# Patient Record
Sex: MALE | Race: WHITE
[De-identification: names, ages, dates, MRNs, and addresses within clinical notes are randomized per-mention and may not be internally consistent; named-entity substitution may affect disease eponyms.]

---

## 2020-09-23 ENCOUNTER — HOSPITAL ENCOUNTER (EMERGENCY)
Dept: HOSPITAL 97 - ER | Age: 80
Discharge: HOME | End: 2020-09-23
Payer: COMMERCIAL

## 2020-09-23 VITALS — DIASTOLIC BLOOD PRESSURE: 94 MMHG | SYSTOLIC BLOOD PRESSURE: 164 MMHG | OXYGEN SATURATION: 97 %

## 2020-09-23 VITALS — TEMPERATURE: 97 F

## 2020-09-23 DIAGNOSIS — I10: ICD-10-CM

## 2020-09-23 DIAGNOSIS — I11.9: Primary | ICD-10-CM

## 2020-09-23 DIAGNOSIS — H92.02: ICD-10-CM

## 2020-09-23 LAB
ALBUMIN SERPL BCP-MCNC: 3.7 G/DL (ref 3.4–5)
ALP SERPL-CCNC: 69 U/L (ref 45–117)
ALT SERPL W P-5'-P-CCNC: 19 U/L (ref 12–78)
AST SERPL W P-5'-P-CCNC: 12 U/L (ref 15–37)
BUN BLD-MCNC: 20 MG/DL (ref 7–18)
GLUCOSE SERPLBLD-MCNC: 100 MG/DL (ref 74–106)
HCT VFR BLD CALC: 41.6 % (ref 39.6–49)
INR BLD: 0.93
LYMPHOCYTES # SPEC AUTO: 1.6 K/UL (ref 0.7–4.9)
MAGNESIUM SERPL-MCNC: 2.3 MG/DL (ref 1.8–2.4)
NT-PROBNP SERPL-MCNC: 100 PG/ML (ref ?–450)
PMV BLD: 8.4 FL (ref 7.6–11.3)
POTASSIUM SERPL-SCNC: 3.9 MMOL/L (ref 3.5–5.1)
RBC # BLD: 5.01 M/UL (ref 4.33–5.43)
TROPONIN (EMERG DEPT USE ONLY): < 0.02 NG/ML (ref 0–0.04)

## 2020-09-23 PROCEDURE — 83880 ASSAY OF NATRIURETIC PEPTIDE: CPT

## 2020-09-23 PROCEDURE — 99285 EMERGENCY DEPT VISIT HI MDM: CPT

## 2020-09-23 PROCEDURE — 85025 COMPLETE CBC W/AUTO DIFF WBC: CPT

## 2020-09-23 PROCEDURE — 96365 THER/PROPH/DIAG IV INF INIT: CPT

## 2020-09-23 PROCEDURE — 71045 X-RAY EXAM CHEST 1 VIEW: CPT

## 2020-09-23 PROCEDURE — 93005 ELECTROCARDIOGRAM TRACING: CPT

## 2020-09-23 PROCEDURE — 83735 ASSAY OF MAGNESIUM: CPT

## 2020-09-23 PROCEDURE — 85610 PROTHROMBIN TIME: CPT

## 2020-09-23 PROCEDURE — 80076 HEPATIC FUNCTION PANEL: CPT

## 2020-09-23 PROCEDURE — 36415 COLL VENOUS BLD VENIPUNCTURE: CPT

## 2020-09-23 PROCEDURE — 84484 ASSAY OF TROPONIN QUANT: CPT

## 2020-09-23 PROCEDURE — 80048 BASIC METABOLIC PNL TOTAL CA: CPT

## 2020-09-23 NOTE — EDPHYS
Physician Documentation                                                                           

 [facility]                                                                 

Name: Jesus Alberto Escalante                                                                                  

Age: 80 yrs                                                                                       

Sex: Male                                                                                         

: 1940                                                                                   

MRN: [id number]                                                                                   

Arrival Date: 2020                                                                          

Time: 15:23                                                                                       

Account#: [id number]                                                                            

Bed 4                                                                                             

Private MD:                                                                                       

ED Physician Austin Salas                                                                       

HPI:                                                                                              

                                                                                             

20:19 This 80 yrs old  Male presents to ER via Ambulatory with complaints of High    kdr 

      Blood Pressure.                                                                             

20:19 The patient has elevated blood pressure and discovered this at a physician's office,    [facility] 

      and sent to the emergency department for evaluation. Onset: The symptoms/episode            

      began/occurred at an unknown time. Modifying factors: The symptoms are aggravated by        

      Nothing, The symptoms are alleviated by Nothing. Associated signs and symptoms: The         

      patient has no apparent associated signs or symptoms. Severity of symptoms: At its          

      worst the blood pressure was severe, just prior to arrival, 217 mm Hg, in the emergency     

      department the blood pressure is improved, mildly, 200 mm Hg, Initially. The patient        

      has not experienced similar symptoms in the past. The patient has not recently seen a       

      physician. The patient was unaware that his BP was elevated.                                

                                                                                                  

Historical:                                                                                       

- Allergies:                                                                                      

15:45 No Known Allergies;                                                                     ss  

- Home Meds:                                                                                      

15:45 candesartan oral 20 mg oral 1 tab once daily [Active];                                  ss  

- PMHx:                                                                                           

15:45 Hypertension;                                                                           ss  

                                                                                                  

- Immunization history:: Adult Immunizations up to date.                                          

- Social history:: Smoking status: Patient denies any tobacco usage or history of.                

                                                                                                  

                                                                                                  

ROS:                                                                                              

20:19 Constitutional: Negative for fever, chills, and weight loss, Eyes: Negative for injury, kdr 

      pain, redness, and discharge, ENT: Negative for injury, pain, and discharge, Neck:          

      Negative for injury, pain, and swelling, Cardiovascular: Negative for chest pain,           

      palpitations, and edema, Respiratory: Negative for shortness of breath, cough,              

      wheezing, and pleuritic chest pain, Abdomen/GI: Negative for abdominal pain, nausea,        

      vomiting, diarrhea, and constipation, Back: Negative for injury and pain, : Negative      

      for injury, bleeding, discharge, and swelling, MS/Extremity: Negative for injury and        

      deformity, Skin: Negative for injury, rash, and discoloration, Neuro: Negative for          

      headache, weakness, numbness, tingling, and seizure activity. Psych: Negative for           

      depression, anxiety, suicide ideation, homicidal ideation, and hallucinations,              

      Allergy/Immunology: Negative for hives, rash, and allergies, Endocrine: Negative for        

      neck swelling, polydipsia, polyuria, polyphagia, and marked weight changes,                 

      Hematologic/Lymphatic: Negative for swollen nodes, abnormal bleeding, and unusual           

      bruising.                                                                                   

                                                                                                  

Exam:                                                                                             

20:19 Constitutional:  This is a well developed, well nourished patient who is awake, alert,  kdr 

      and in no acute distress. Head/Face:  Normocephalic, atraumatic. Eyes:  Pupils equal        

      round and reactive to light, extra-ocular motions intact.  Lids and lashes normal.          

      Conjunctiva and sclera are non-icteric and not injected.  Cornea within normal limits.      

      Periorbital areas with no swelling, redness, or edema. Neck:  Trachea midline, no           

      thyromegaly or masses palpated, and no cervical lymphadenopathy.  Supple, full range of     

      motion without nuchal rigidity, or vertebral point tenderness.  No Meningismus.             

      Chest/axilla:  Normal chest wall appearance and motion.  Nontender with no deformity.       

      No lesions are appreciated. Cardiovascular:  Regular rate and rhythm with a normal S1       

      and S2.  No gallops, murmurs, or rubs.  Normal PMI, no JVD.  No pulse deficits.             

      Respiratory:  Lungs have equal breath sounds bilaterally, clear to auscultation and         

      percussion.  No rales, rhonchi or wheezes noted.  No increased work of breathing, no        

      retractions or nasal flaring. Abdomen/GI:  Soft, non-tender, with normal bowel sounds.      

      No distension or tympany.  No guarding or rebound.  No evidence of tenderness               

      throughout. Back:  No spinal tenderness.  No costovertebral tenderness.  Full range of      

      motion. Skin:  Warm, dry with normal turgor.  Normal color with no rashes, no lesions,      

      and no evidence of cellulitis. MS/ Extremity:  Pulses equal, no cyanosis.                   

      Neurovascular intact.  Full, normal range of motion. Neuro:  Awake and alert, GCS 15,       

      oriented to person, place, time, and situation.  Cranial nerves II-XII grossly intact.      

      Motor strength 5/5 in all extremities.  Sensory grossly intact.  Cerebellar exam            

      normal.  Normal gait. Psych:  Awake, alert, with orientation to person, place and time.     

       Behavior, mood, and affect are within normal limits.                                       

20:36 ECG was reviewed by the Attending Physician.                                            kdr 

                                                                                                  

Vital Signs:                                                                                      

15:40  / 117; Pulse 60; Resp 16; Temp 97.0(TE); Pulse Ox 100% on R/A; Weight 74.39 kg;  ss  

      Height 5 ft. 9 in. (175.26 cm); Pain 0/10;                                                  

16:00  / 107; Pulse 62; Resp 17; Pulse Ox 99% ;                                         jl7 

16:30  / 94; Pulse 66; Resp 15; Pulse Ox 98% ;                                          jl7 

17:16  / 96; Pulse 75; Resp 17; Pulse Ox 100% ;                                         jl7 

17:56  / 101; Pulse 72;                                                                 ss  

18:45  / 92;                                                                            ss  

18:45 Pulse 58;                                                                               ss  

18:48  / 94; Pulse 64; Pulse Ox 97% on R/A; Pain 0/10;                                  ss  

15:40 Body Mass Index 24.22 (74.39 kg, 175.26 cm)                                             ss  

17:56 Dr. Salas notified. Hydralazine 25mg PO ordered                                       ss  

                                                                                                  

MDM:                                                                                              

17:34 Patient medically screened.                                                             kdr 

20:19 Data reviewed: vital signs, nurses notes, lab test result(s). Counseling: I had a       kdr 

      detailed discussion with the patient and/or guardian regarding: the historical points,      

      exam findings, and any diagnostic results supporting the discharge/admit diagnosis, lab     

      results, the need for outpatient follow up. ED course: The patient BP responded well to     

      Hydralazine. Consulted DR. Martell who saw the patient in the ED and advised on discharge      

      and follow-up.                                                                              

                                                                                                  

                                                                                             

15:45 Order name: Basic Metabolic Panel; Complete Time: 17:                                 [facility] 

                                                                                             

15:45 Order name: CBC with Diff; Complete Time: 17:                                         [facility] 

                                                                                             

15:45 Order name: LFT's; Complete Time: 17:                                                 [facility] 

                                                                                             

15:45 Order name: Magnesium; Complete Time: 17:                                             [facility] 

                                                                                             

15:45 Order name: NT PRO-BNP; Complete Time: 17:                                            [facility] 

                                                                                             

15:45 Order name: PT-INR; Complete Time: 17:                                                [facility] 

                                                                                             

15:45 Order name: Troponin (emerg Dept Use Only); Complete Time: 17:                        [facility] 

                                                                                             

15:45 Order name: XRAY Chest (1 view); Complete Time: 17:                                   [facility] 

                                                                                             

15:45 Order name: EKG; Complete Time: 15:46                                                   [facility] 

                                                                                             

15:45 Order name: Cardiac monitoring; Complete Time: 16:13                                    [facility] 

                                                                                             

15:45 Order name: EKG - Nurse/Tech; Complete Time: 16:39                                      [facility] 

                                                                                             

15:45 Order name: IV Saline Lock; Complete Time: 16:13                                        [facility] 

                                                                                             

15:45 Order name: Labs collected and sent; Complete Time: 16:13                               [facility] 

                                                                                             

15:45 Order name: O2 Per Protocol; Complete Time: 16:13                                       [facility] 

                                                                                             

15:45 Order name: O2 Sat Monitoring; Complete Time: 16:13                                     [facility] 

                                                                                                  

EC:36 Rate is 62 beats/min. Rhythm is regular, Normal Sinus Rhythm with No ectopy. QRS Axis   kdr 

      is Normal. HI interval is normal. QRS interval is normal. QT interval is normal. No Q       

      waves. Clinical impression: Normal ECG.                                                     

                                                                                                  

Administered Medications:                                                                         

16:00 Drug: hydrALAZINE 10 mg Route: IV; Rate: calculated rate; Site: left forearm;           [facility] 

16:30 Follow up: Response: Blood pressure is lowered; IV Status: Completed infusion           jl7 

18:01 Not Given (10 mg tabs available only. GIve 20 mg now per Dr. Salas): HydrALAZINE 25   ss  

      mg PO once                                                                                  

18:07 Drug: hydrALAZINE 20 mg Route: PO;                                                      ss  

18:50 Follow up: Response: No adverse reaction; Blood pressure is lowered                     jl7 

                                                                                                  

                                                                                                  

Disposition:                                                                                      

20 17:34 Discharged to Home. Impression: Hypertensive heart disease - Poorly controlled.    

- Condition is Stable.                                                                            

- Discharge Instructions: Hypertension, Easy-to-Read.                                             

- Prescriptions for Hydralazine 25 mg Oral Tablet - take 1 tablet by ORAL route 2 times           

  per day with food; 20 tablet.                                                                   

- Medication Reconciliation Form, Thank You Letter form.                                          

- Follow up: Private Physician; When: 2 - 3 days; Reason: If symptoms return, Further             

  diagnostic work-up, Recheck today's complaints, Continuance of care, Re-evaluation by           

  your physician.                                                                                 

- Problem is an acute exacerbation.                                                               

- Symptoms have improved.                                                                         

- Notes: Please call Dr. Martell if [phone])you are unable to contact Dr. Jiménez for              

  follow-up                                                                                       

                                                                                                  

                                                                                                  

Signatures:                                                                                       

Dispatcher [employer]                           EDMS                                                 

Austin Salas MD MD   kdr                                                  

Neva Son RN                      RN   ss                                                   

Bismark Jules RN                        RN   jl7                                                  

                                                                                                  

Corrections: (The following items were deleted from the chart)                                    

19:01 17:34 2020 17:34 Discharged to Home. Impression: Hypertensive heart disease -     jl7 

      Poorly controlled. Condition is Stable. Forms are Medication Reconciliation Form, Thank     

      You Letter, Antibiotic Education, Prescription Opioid Use. Follow up: Private               

      Physician; When: 2 - 3 days; Reason: If symptoms return, Further diagnostic work-up,        

      Recheck today's complaints, Continuance of care, Re-evaluation by your physician.           

      Problem is an acute exacerbation. Symptoms have improved. kdr                               

                                                                                                  

**************************************************************************************************

## 2020-09-23 NOTE — RAD REPORT
EXAM DESCRIPTION:  RAD - Chest Single View - 9/23/2020 4:42 pm

 

CLINICAL HISTORY:  HTN

Chest pain.

 

COMPARISON:  CHEST PA AND LAT 2 VIEW dated 7/11/2013

 

FINDINGS:  Portable technique limits examination quality.

 

The lungs are grossly clear. The heart is upper limit of normal in size. No displaced fractures.

 

IMPRESSION:  No acute intrathoracic process suspected.

## 2020-09-23 NOTE — ER
Nurse's Notes                                                                                     

 [facility]                                                                 

Name: Jesus Alberto Escalante                                                                                  

Age: 80 yrs                                                                                       

Sex: Male                                                                                         

: 1940                                                                                   

MRN: [id number]                                                                                   

Arrival Date: 2020                                                                          

Time: 15:23                                                                                       

Account#: [id number]                                                                            

Bed 4                                                                                             

Private MD:                                                                                       

Diagnosis: Hypertensive heart disease-Poorly controlled                                           

                                                                                                  

Presentation:                                                                                     

                                                                                             

15:40 Chief complaint: Patient states: "I had an appointment with Dr. Zhao, but my blood   ss  

      pressure was 200/?? and she advised me to come here." Pt denies SOB, CP, reports he may     

      have a slight headache. Coronavirus screen: Client denies travel out of the [place] in the     

      last 14 days. Ebola Screen: Patient denies exposure to infectious person. Patient           

      denies travel to an Ebola-affected area in the 21 days before illness onset. Initial        

      Sepsis Screen: Does the patient meet any 2 criteria? No. Patient's initial sepsis           

      screen is negative. Does the patient have a suspected source of infection? No.              

      Patient's initial sepsis screen is negative. Risk Assessment: Do you want to hurt           

      yourself or someone else? Patient reports no desire to harm self or others.                 

15:40 Method Of Arrival: Ambulatory                                                           ss  

15:40 Acuity: DARÍO 2                                                                           ss  

16:54 Onset of symptoms is unknown. Care prior to arrival: None. Transition of care: patient  jose 

      was not received from another setting of care.                                              

                                                                                                  

Historical:                                                                                       

- Allergies:                                                                                      

15:45 No Known Allergies;                                                                     ss  

- Home Meds:                                                                                      

15:45 candesartan oral 20 mg oral 1 tab once daily [Active];                                  ss  

- PMHx:                                                                                           

15:45 Hypertension;                                                                           ss  

                                                                                                  

- Immunization history:: Adult Immunizations up to date.                                          

- Social history:: Smoking status: Patient denies any tobacco usage or history of.                

                                                                                                  

                                                                                                  

Screenin:00 Abuse screen: Denies threats or abuse. Denies injuries from another. Nutritional        jl7 

      screening: No deficits noted. Tuberculosis screening: No symptoms or risk factors           

      identified. Fall Risk IV access (20 points). Total Veras Fall Scale indicates No Risk       

      (0-24 pts).                                                                                 

                                                                                                  

Assessment:                                                                                       

16:00 General: Appears in no apparent distress. uncomfortable, Behavior is calm, cooperative, jl7 

      appropriate for age. Pain: Denies pain. Neuro: Level of Consciousness is awake, alert,      

      obeys commands, Oriented to person, place, time, situation. Cardiovascular: Denies          

      chest pain, Patient's skin is warm and dry. Respiratory: Airway is patent Respiratory       

      effort is even, unlabored, Respiratory pattern is regular, symmetrical, Denies              

      shortness of breath. GI: No signs and/or symptoms were reported involving the               

      gastrointestinal system. : No signs and/or symptoms were reported regarding the           

      genitourinary system. Derm: Skin is pink, warm \T\ dry.                                     

17:00 Reassessment: Patient appears in no apparent distress at this time. No changes from     jl7 

      previously documented assessment. Patient and/or family updated on plan of care and         

      expected duration. Pain level reassessed. Patient is alert, oriented x 3, equal             

      unlabored respirations, skin warm/dry/pink.                                                 

18:07 Reassessment: Discharge on hold. Observing patient and monitoring VS after hydralazine  ss  

      administration.                                                                             

18:59 Reassessment: Patient appears in no apparent distress at this time. No changes from     jl7 

      previously documented assessment. Patient and/or family updated on plan of care and         

      expected duration. Pain level reassessed. Patient is alert, oriented x 3, equal             

      unlabored respirations, skin warm/dry/pink.                                                 

                                                                                                  

Vital Signs:                                                                                      

15:40  / 117; Pulse 60; Resp 16; Temp 97.0(TE); Pulse Ox 100% on R/A; Weight 74.39 kg;  ss  

      Height 5 ft. 9 in. (175.26 cm); Pain 0/10;                                                  

16:00  / 107; Pulse 62; Resp 17; Pulse Ox 99% ;                                         jl7 

16:30  / 94; Pulse 66; Resp 15; Pulse Ox 98% ;                                          jl7 

17:16  / 96; Pulse 75; Resp 17; Pulse Ox 100% ;                                         jl7 

17:56  / 101; Pulse 72;                                                                 ss  

18:45  / 92;                                                                            ss  

18:45 Pulse 58;                                                                               ss  

18:48  / 94; Pulse 64; Pulse Ox 97% on R/A; Pain 0/10;                                  ss  

15:40 Body Mass Index 24.22 (74.39 kg, 175.26 cm)                                             ss  

17:56 Dr. Salas notified. Hydralazine 25mg PO ordered                                       ss  

                                                                                                  

ED Course:                                                                                        

15:23 Patient arrived in ED.                                                                  ag5 

15:35 Bismark Jules, SILVANO is Primary Nurse.                                                      jl7 

15:43 Triage completed.                                                                       ss  

15:44 Austin Salas MD is Attending Physician.                                              kdr 

15:45 Arm band placed on right wrist.                                                         ss  

16:00 Patient has correct armband on for positive identification. Placed in gown. Bed in low  jl7 

      position. Call light in reach. Side rails up X 1. Cardiac monitor on. Pulse ox on. NIBP     

      on.                                                                                         

16:00 Initial lab(s) drawn, by me, sent to lab. Inserted saline lock: 20 gauge in left        jl7 

      forearm, using aseptic technique. Blood collected.                                          

16:36 EKG done, by ED staff, reviewed by Austin Salas MD.                                    [facility] 

16:42 XRAY Chest (1 view) In Process Unspecified.                                             EDMS

18:59 No provider procedures requiring assistance completed. IV discontinued, intact,         jl7 

      bleeding controlled, No redness/swelling at site. Pressure dressing applied.                

                                                                                                  

Administered Medications:                                                                         

16:00 Drug: hydrALAZINE 10 mg Route: IV; Rate: calculated rate; Site: left forearm;           7 

16:30 Follow up: Response: Blood pressure is lowered; IV Status: Completed infusion           jl7 

18:01 Not Given (10 mg tabs available only. GIve 20 mg now per Dr. Salas): HydrALAZINE 25   ss  

      mg PO once                                                                                  

18:07 Drug: hydrALAZINE 20 mg Route: PO;                                                      ss  

18:50 Follow up: Response: No adverse reaction; Blood pressure is lowered                     jl 

                                                                                                  

                                                                                                  

Outcome:                                                                                          

17:34 Discharge ordered by MD.                                                                kdr 

18:59 Discharged to home ambulatory.                                                          jl7 

18:59 Condition: stable                                                                           

18:59 Discharge instructions given to patient, family, Instructed on discharge instructions,      

      follow up and referral plans. medication usage, Demonstrated understanding of               

      instructions, follow-up care, medications, Prescriptions given X 1.                         

19:01 Patient left the ED.                                                                    jl7 

                                                                                                  

Signatures:                                                                                       

Dispatcher [employer]                           EDMS                                                 

Austin Salas MD MD   [facility]                                                  

Neva Son RN                      RN                                                      

Bismark Jules RN                        RN   [facility]                                                  

Mckenna Zhao                              [facility]                                                  

Kyree Cifuentes                                5                                                  

                                                                                                  

Corrections: (The following items were deleted from the chart)                                    

16:54 16:53  / 94; Pulse 66bpm; Resp 15bpm; Pulse Ox 98%; jl7                           jl7 

                                                                                                  

**************************************************************************************************

## 2020-09-24 NOTE — EKG
Test Date:    2020-09-23               Test Time:    16:35:21

Technician:   MARGE                                     

                                                     

MEASUREMENT RESULTS:                                       

Intervals:                                           

Rate:         62                                     

ND:           176                                    

QRSD:         102                                    

QT:           434                                    

QTc:          440                                    

Axis:                                                

P:            47                                     

ND:           176                                    

QRS:          -13                                    

T:            33                                     

                                                     

INTERPRETIVE STATEMENTS:                                       

                                                     

Normal sinus rhythm

Normal ECG

No previous ECG available for comparison



Electronically Signed On 09-24-20 08:43:19 CDT by Alireza Sandhu

## 2020-09-28 NOTE — P.CNS
Date of Consult: 09/23/20


Reason for Consult: Hypertensive urgency 


Requesting Physician: Austin Salas


Primary Care Provider: Bear Jiménez


Chief Complaint: Headache 


History of Present Illness: 





patient is a 80-year-old gentleman who came into the emergency room with 

headache. His left ear has been bothering him. He was seen a local ENT physician

at her office. At the office the blood pressure was 200/100. Patient was sent to

the emergency room where he was worked up by emergency room physician. He was 

found to have a significantly elevated blood pressure. I got a call from the 

emergency room physician for evaluation. I spoke to the patient about the high 

blood pressure. We were able to get it down to about 150/90 in the emergency 

room with IV medications-hydralazine. His headache was improved. He was not 

wanting to be admitted to the hospital. At this time, the patient can be 

discharged and will give him hydralazine 25 mg p.o. b.i.d. Will need to follow 

up with his PCP hand he will also need to see a cardiologist for complete 

cardiac workup including Echocardiogram, carotid Doppler, and possibly a stress 

tests. I told him the importance of evaluating why his blood pressure suddenly 

became elevated. He appears to understand. Patient will be discharged with 

additional BP medications at this time. 





- Past Medical/Surgical History


-: Hypertension 


Past Surgical History: Reviewed- Non-Contributory





- Family History


  ** Father


Family History: Reviewed- Non-Contributory





- Social History


Smoking Status: Never smoker


Smoking therapy provided: No


Alcohol use: No


CD- Drugs: No





Review of Systems


10-point ROS is otherwise unremarkable





Physical Examination














Temp Pulse Resp BP Pulse Ox


 


 97.0 F   64   17   164/94 H   


 


 09/23/20 15:40  09/23/20 18:48  09/23/20 17:16  09/23/20 18:48   








General: Alert, In no apparent distress, Oriented x3


HEENT: Atraumatic, PERRLA, Mucous membr. moist/pink, EOMI, Sclerae nonicteric


Neck: Supple, 2+ carotid pulse no bruit, No LAD, Without JVD or thyroid 

abnormality


Respiratory: Clear to auscultation bilaterally, Normal air movement


Cardiovascular: Regular rate/rhythm, Normal S1 S2, No murmurs


Gastrointestinal: Normal bowel sounds, Soft and benign, Non-distended, No 

tenderness


Musculoskeletal: No tenderness


Integumentary: No rashes


Neurological: Normal gait, Normal speech, Normal strength at 5/5 x4 extr, Normal

tone, Sensation intact, Cranial nerves 3-12 intact, Normal affect


Lymphatics: No axilla or inguinal lymphadenopathy





- Problems


(1) Hypertensive urgency


Status: Acute   





(2) Left ear pain


Status: Acute   


Conclusions/ Impression: 





Plan:


1. Start patient on hydralazine 25 mg p.o. b.i.d.


2. Outpatient cardiology workup with carotid Dopplers, echocardiogram, and 

possible stress test 


3. If headache returns patient needs to follow up with urology


4. Return to the Emergency room if his symptoms worsen 


5. Follow with PCP in 1-2 weeks 


Critical Care: No


Time Spent Managing Pts care (In Minutes): 40

## 2022-12-22 LAB
BUN BLD-MCNC: 19 MG/DL (ref 7–18)
GLUCOSE SERPLBLD-MCNC: 113 MG/DL (ref 74–106)
HCT VFR BLD CALC: 39.7 % (ref 39.6–49)
INR BLD: 0.93
LYMPHOCYTES # SPEC AUTO: 1.5 K/UL (ref 0.7–4.9)
MCV RBC: 82.7 FL (ref 80–100)
PMV BLD: 8 FL (ref 7.6–11.3)
POTASSIUM SERPL-SCNC: 3.5 MMOL/L (ref 3.5–5.1)
RBC # BLD: 4.8 M/UL (ref 4.33–5.43)

## 2022-12-22 NOTE — RAD REPORT
EXAM DESCRIPTION:  RAD - Chest Pa And Lat (2 Views) - 12/22/2022 8:50 am

 

CLINICAL HISTORY:  pre op for surgery

Chest pain.

 

COMPARISON:  Chest Pa And Lat (2 Views) dated 12/9/2020; Chest Single View dated 9/23/2020; CHEST PA 
AND LAT 2 VIEW dated 7/11/2013

 

FINDINGS:  The lungs are clear. The heart is upper limit of normal in size. No displaced fractures.

 

IMPRESSION:  No acute or concerning finding suspected.

## 2022-12-24 NOTE — EKG
Test Date:    2022-12-22               Test Time:    08:21:47

Technician:   TANYA                                     

                                                     

MEASUREMENT RESULTS:                                       

Intervals:                                           

Rate:         53                                     

DE:           182                                    

QRSD:         106                                    

QT:           436                                    

QTc:          409                                    

Axis:                                                

P:            65                                     

DE:           182                                    

QRS:          19                                     

T:            41                                     

                                                     

INTERPRETIVE STATEMENTS:                                       

                                                     

Sinus bradycardia

Otherwise normal ECG

Compared to ECG 09/23/2020 16:35:21

Sinus rhythm no longer present



Electronically Signed On 12-24-22 17:25:26 CST by Walt Burris

## 2022-12-28 ENCOUNTER — HOSPITAL ENCOUNTER (OUTPATIENT)
Dept: HOSPITAL 97 - OR | Age: 82
Discharge: HOME | End: 2022-12-28
Attending: ORTHOPAEDIC SURGERY
Payer: COMMERCIAL

## 2022-12-28 VITALS — TEMPERATURE: 97.4 F | OXYGEN SATURATION: 97 % | SYSTOLIC BLOOD PRESSURE: 142 MMHG | DIASTOLIC BLOOD PRESSURE: 68 MMHG

## 2022-12-28 DIAGNOSIS — M25.542: ICD-10-CM

## 2022-12-28 DIAGNOSIS — I10: ICD-10-CM

## 2022-12-28 DIAGNOSIS — M65.322: Primary | ICD-10-CM

## 2022-12-28 DIAGNOSIS — K21.9: ICD-10-CM

## 2022-12-28 DIAGNOSIS — M65.332: ICD-10-CM

## 2022-12-28 PROCEDURE — 0LN80ZZ RELEASE LEFT HAND TENDON, OPEN APPROACH: ICD-10-PCS

## 2022-12-28 PROCEDURE — 80048 BASIC METABOLIC PNL TOTAL CA: CPT

## 2022-12-28 PROCEDURE — 36415 COLL VENOUS BLD VENIPUNCTURE: CPT

## 2022-12-28 PROCEDURE — 26055 INCISE FINGER TENDON SHEATH: CPT

## 2022-12-28 PROCEDURE — 85730 THROMBOPLASTIN TIME PARTIAL: CPT

## 2022-12-28 PROCEDURE — 85610 PROTHROMBIN TIME: CPT

## 2022-12-28 PROCEDURE — 93005 ELECTROCARDIOGRAM TRACING: CPT

## 2022-12-28 PROCEDURE — 71046 X-RAY EXAM CHEST 2 VIEWS: CPT

## 2022-12-28 PROCEDURE — 85025 COMPLETE CBC W/AUTO DIFF WBC: CPT

## 2022-12-28 RX ADMIN — HYDROMORPHONE HYDROCHLORIDE ONE MG: 1 INJECTION, SOLUTION INTRAMUSCULAR; INTRAVENOUS; SUBCUTANEOUS at 12:41

## 2022-12-28 RX ADMIN — HYDROMORPHONE HYDROCHLORIDE ONE MG: 1 INJECTION, SOLUTION INTRAMUSCULAR; INTRAVENOUS; SUBCUTANEOUS at 12:56

## 2022-12-28 RX ADMIN — FENTANYL CITRATE ONE MCG: 50 INJECTION, SOLUTION INTRAMUSCULAR; INTRAVENOUS at 12:26

## 2022-12-28 RX ADMIN — BUPIVACAINE HYDROCHLORIDE ONE ML: 2.5 INJECTION, SOLUTION EPIDURAL; INFILTRATION; INTRACAUDAL at 10:19

## 2022-12-28 RX ADMIN — FENTANYL CITRATE ONE MCG: 50 INJECTION, SOLUTION INTRAMUSCULAR; INTRAVENOUS at 12:29

## 2022-12-28 RX ADMIN — HYDROMORPHONE HYDROCHLORIDE ONE MG: 1 INJECTION, SOLUTION INTRAMUSCULAR; INTRAVENOUS; SUBCUTANEOUS at 12:51

## 2022-12-28 RX ADMIN — BUPIVACAINE HYDROCHLORIDE ONE ML: 2.5 INJECTION, SOLUTION EPIDURAL; INFILTRATION; INTRACAUDAL at 11:47

## 2022-12-28 RX ADMIN — BUPIVACAINE HYDROCHLORIDE ONE ML: 2.5 INJECTION, SOLUTION EPIDURAL; INFILTRATION; INTRACAUDAL at 11:52

## 2022-12-28 RX ADMIN — HYDROMORPHONE HYDROCHLORIDE ONE MG: 1 INJECTION, SOLUTION INTRAMUSCULAR; INTRAVENOUS; SUBCUTANEOUS at 12:46

## 2022-12-28 NOTE — P.BOP
Preoperative diagnosis: left index and  middle finger trigger digit


Postoperative diagnosis: same


Primary procedure: left index finger A1 pulley release


Secondary procedure: left middle finger A1 pulley release


Assistant: NONE,NONE


Estimated blood loss: 3 cc


Specimen: none


Findings: see dictation


Anesthesia: General


Complications: None


Implants: none


Fluids & blood products: per anesthesia record; TT: 23 mins @ 250 mmHg


Transferred to: Recovery Room


Condition: Good

## 2022-12-29 NOTE — OP
Date of Procedure:  12/28/2022



Surgeon:  Xavier Drew MD



Preoperative Diagnoses:  Left index and middle finger trigger digits.



Postoperative Diagnoses:  Left index and middle finger trigger digits.



Procedures Performed:  

1.Left index finger A1 pulley release.

2.Left middle finger A1 pulley release.



Anesthesia:  General LMA.



Fluids:  Per Anesthesia record.



Estimated Blood Loss:  3 cc.



Complications:  None.



Tourniquet Time:  23 minutes at 250 mmHg.



Indication For Procedures:  Jesus Alberto is an 82-year-old male who presented to my clinic with signs and s
ymptoms consistent with left index and middle finger trigger digits.  Patient failed conservative gilda
atment measures including corticosteroid injection.  This pain interferes with his activities of lisa
y living, and we discussed operative treatment as well as nonoperative treatment.  He expressed under
standing and elected to proceed with operative treatment.



Description Of Procedure:  After informed consent was obtained, the patient was identified in the pre
operative holding area.  The left index and middle finger were marked.  Patient was then brought back
 to the operating room, transferred to the operating table in the supine fashion, placed under genera
l LMA anesthesia.  The left upper extremity was then prepped and draped in usual sterile fashion.  A 
time-out was initiated.  The correct patient and procedure were confirmed and identified.  Patient di
d receive his preoperative prophylactic antibiotics.  The left upper extremity was exsanguinated usin
g the Esmarch and the tourniquet was inflated to 250 mmHg.  Attention was first taken to the middle f
yoel where an approximately 1 cm longitudinal incision was made centered over the A1 pulley.  Dissec
tion was taken down to the flexor tendon sheath.  Gentle retractor was performed using Josee.  The 
flexor tendon sheath was identified and incised along with the A1 pulley.  A portion of the flexor te
ndon sheath was excised to minimize risk for recurrence.  There was significant amount of tenosynovia
l fluid that was expressed after release of the flexor tendon sheath.  The flexor tendon was then bro
ught out through the incision using the Ragnell.  There was full excursion of the tendon without any 
triggering noted.  The wound was then irrigated thoroughly with normal saline.  Skin was approximated
 using a 5-0 Prolene.  Next, attention was taken to the index finger.  A 1 cm longitudinal incision w
as made centered over the A1 pulley.  Dissection was then taken down to the flexor tendon sheath.  Th
e tendon sheath was identified and was split in line with the incision.  A portion of the flexor tend
on sheath was excised to minimize risk for recurrence.  The flexor tendon was then brought out throug
h the incision and there was full excursion of the tendon without triggering noted.  The wound was th
en irrigated thoroughly with normal saline.  Skin was approximated using a 5-0 Prolene.  Sterile dres
sings were applied.  Tourniquet was let down.  Patient was awakened and transferred to PACU in stable
 condition.



Postoperative Plan:  Patient will be nonweightbearing in his left upper extremity.  He will follow up
 in 1 to 2 weeks for wound check and suture removal.





CV/MODL

DD:  12/29/2022 15:56:28Voice ID:  [id number]

DT:  12/29/2022 17:08:49Report ID:  [id number]